# Patient Record
Sex: FEMALE | Race: WHITE
[De-identification: names, ages, dates, MRNs, and addresses within clinical notes are randomized per-mention and may not be internally consistent; named-entity substitution may affect disease eponyms.]

---

## 2018-03-01 ENCOUNTER — HOSPITAL ENCOUNTER (EMERGENCY)
Dept: HOSPITAL 47 - EC | Age: 23
Discharge: HOME | End: 2018-03-01
Payer: COMMERCIAL

## 2018-03-01 VITALS
RESPIRATION RATE: 19 BRPM | SYSTOLIC BLOOD PRESSURE: 140 MMHG | HEART RATE: 99 BPM | DIASTOLIC BLOOD PRESSURE: 83 MMHG | TEMPERATURE: 98 F

## 2018-03-01 DIAGNOSIS — T81.4XXA: Primary | ICD-10-CM

## 2018-03-01 DIAGNOSIS — Z88.0: ICD-10-CM

## 2018-03-01 DIAGNOSIS — Z91.040: ICD-10-CM

## 2018-03-01 DIAGNOSIS — Z98.890: ICD-10-CM

## 2018-03-01 PROCEDURE — 87186 SC STD MICRODIL/AGAR DIL: CPT

## 2018-03-01 PROCEDURE — 87205 SMEAR GRAM STAIN: CPT

## 2018-03-01 PROCEDURE — 87070 CULTURE OTHR SPECIMN AEROBIC: CPT

## 2018-03-01 PROCEDURE — 99284 EMERGENCY DEPT VISIT MOD MDM: CPT

## 2018-03-01 PROCEDURE — 87077 CULTURE AEROBIC IDENTIFY: CPT

## 2018-03-01 NOTE — ED
General Adult HPI





- General


Chief complaint: Abdominal Pain


Stated complaint: Poss.infection


Time Seen by Provider: 18 19:45


Source: patient


Mode of arrival: ambulatory


Limitations: no limitations





- History of Present Illness


Initial comments: 


22-year-old female patient presents to the emergency department today for 

evaluation of her  incision site.  Patient states approximate one week 

ago the left and of the incision seemed to open and has been draining pus.  She 

states that the area is tender to the touch.  She underwent  on 2018 with a doctor at [facility] in [place]. States things the been 

going well.  She denies any abdominal pain with this.  Denies any fevers or 

chills.  Denies any nausea or vomiting.  She denies any abnormal vaginal 

bleeding or discharge.  Patient denies any recent rash, shortness breath, chest 

pain, diarrhea, constipation, back pain, numbness, tingling, dizziness, weakness

, hematuria, dysuria, urinary urgency, urinary frequency, headache, visual 

changes, or any other complaints.








- Related Data


 Previous Rx's











 Medication  Instructions  Recorded


 


Cephalexin [Keflex] 500 mg PO Q6H #40 cap 18











 Allergies











Allergy/AdvReac Type Severity Reaction Status Date / Time


 


latex Allergy  Rash/Hives Verified 18 20:15


 


Penicillins Allergy  Swelling Verified 18 20:15














Review of Systems


ROS Statement: 


Those systems with pertinent positive or pertinent negative responses have been 

documented in the HPI.





ROS Other: All systems not noted in ROS Statement are negative.





Past Medical History


Past Medical History: No Reported History


History of Any Multi-Drug Resistant Organisms: None Reported


Past Surgical History:  Section


Past Psychological History: Depression


Smoking Status: Never smoker


Past Alcohol Use History: None Reported


Past Drug Use History: None Reported





General Exam


Limitations: no limitations


General appearance: alert, in no apparent distress, other (This is a well-

developed, well-nourished, nontoxic appearing adult female patient in no acute 

distress.  Vital signs upon presentation are temperature 98.0F, pulse 99, 

respirations 19, blood pressure 140/83, pulse ox 100% on room air.)


Eye exam: Present: normal appearance, PERRL, EOMI.  Absent: scleral icterus, 

conjunctival injection, periorbital swelling


ENT exam: Present: normal exam, normal oropharynx, mucous membranes moist


Respiratory exam: Present: normal lung sounds bilaterally.  Absent: respiratory 

distress, wheezes, rales, rhonchi, stridor


Cardiovascular Exam: Present: regular rate, normal rhythm, normal heart sounds.

  Absent: systolic murmur, diastolic murmur, rubs, gallop, clicks


GI/Abdominal exam: Present: soft, normal bowel sounds, other (There is a 

horizontal incision at the suprapubic area.  The left and the incision does 

show an open wound with very small amount of drainage.  Abdomen is soft and 

nontender around the area.  There is mild surrounding erythema.).  Absent: 

distended, tenderness, guarding, rebound, rigid


Neurological exam: Present: alert, oriented X3, CN II-XII intact


Psychiatric exam: Present: normal affect, normal mood


Skin exam: Present: warm, dry, intact, normal color.  Absent: rash





Course


 Vital Signs











  18





  19:38


 


Temperature 98.0 F


 


Pulse Rate 99


 


Respiratory 19





Rate 


 


Blood Pressure 140/83


 


O2 Sat by Pulse 100





Oximetry 














Medical Decision Making





- Medical Decision Making


22-year-old female patient presented for evaluation of her  incision 

site.  Physical examination does reveal an open wound area to the left end of 

the incision.  The site shows very minimal drainage.  There is minimal 

surrounding erythema.  We will start her on Keflex.  We will discharge her home 

as she is afebrile and nontoxic.  She is instructed to follow-up with her OB/

GYN as soon as possible.  She is instructed to return here immediately for any 

new, worsening, or concerning symptoms.  Return parameters discussed in detail.

  She verbalizes understanding and agrees with this plan.








Disposition


Clinical Impression: 


 Surgical wound infection





Disposition: HOME SELF-CARE


Condition: Good


Instructions:  Acute Wound Care (ED)


Additional Instructions: 


Take antibiotic prescription in full.  Continue taking ibuprofen for pain 

control.  Keep wound clean and dry. Follow up with your OBGYN as soon as 

possible. Return here immediately for any new, worsening, or concerning 

symptoms.


Prescriptions: 


Cephalexin [Keflex] 500 mg PO Q6H #40 cap


Referrals: 


None,Stated [Primary Care Provider] - 1-2 days


Time of Disposition: 20:13

## 2021-04-21 ENCOUNTER — HOSPITAL ENCOUNTER (EMERGENCY)
Dept: HOSPITAL 47 - EC | Age: 26
Discharge: HOME | End: 2021-04-21
Payer: COMMERCIAL

## 2021-04-21 VITALS — RESPIRATION RATE: 18 BRPM

## 2021-04-21 VITALS — TEMPERATURE: 98 F | SYSTOLIC BLOOD PRESSURE: 120 MMHG | DIASTOLIC BLOOD PRESSURE: 81 MMHG | HEART RATE: 92 BPM

## 2021-04-21 DIAGNOSIS — F32.9: ICD-10-CM

## 2021-04-21 DIAGNOSIS — N39.0: Primary | ICD-10-CM

## 2021-04-21 DIAGNOSIS — Z88.0: ICD-10-CM

## 2021-04-21 LAB
ALBUMIN SERPL-MCNC: 4.7 G/DL (ref 3.5–5)
ALP SERPL-CCNC: 71 U/L (ref 38–126)
ALT SERPL-CCNC: 17 U/L (ref 4–34)
AMYLASE SERPL-CCNC: 54 U/L (ref 30–110)
ANION GAP SERPL CALC-SCNC: 8 MMOL/L
AST SERPL-CCNC: 25 U/L (ref 14–36)
BASOPHILS # BLD AUTO: 0 K/UL (ref 0–0.2)
BASOPHILS NFR BLD AUTO: 0 %
BUN SERPL-SCNC: 13 MG/DL (ref 7–17)
CALCIUM SPEC-MCNC: 9.7 MG/DL (ref 8.4–10.2)
CHLORIDE SERPL-SCNC: 104 MMOL/L (ref 98–107)
CO2 SERPL-SCNC: 28 MMOL/L (ref 22–30)
EOSINOPHIL # BLD AUTO: 0 K/UL (ref 0–0.7)
EOSINOPHIL NFR BLD AUTO: 1 %
ERYTHROCYTE [DISTWIDTH] IN BLOOD BY AUTOMATED COUNT: 4.99 M/UL (ref 3.8–5.4)
ERYTHROCYTE [DISTWIDTH] IN BLOOD: 13 % (ref 11.5–15.5)
GLUCOSE SERPL-MCNC: 97 MG/DL (ref 74–99)
HCG SERPL-MCNC: <2.4 MIU/ML
HCT VFR BLD AUTO: 42.3 % (ref 34–46)
HGB BLD-MCNC: 14.5 GM/DL (ref 11.4–16)
LIPASE SERPL-CCNC: 145 U/L (ref 23–300)
LYMPHOCYTES # SPEC AUTO: 2.1 K/UL (ref 1–4.8)
LYMPHOCYTES NFR SPEC AUTO: 31 %
MCH RBC QN AUTO: 29.1 PG (ref 25–35)
MCHC RBC AUTO-ENTMCNC: 34.3 G/DL (ref 31–37)
MCV RBC AUTO: 84.7 FL (ref 80–100)
MONOCYTES # BLD AUTO: 0.5 K/UL (ref 0–1)
MONOCYTES NFR BLD AUTO: 7 %
NEUTROPHILS # BLD AUTO: 4.1 K/UL (ref 1.3–7.7)
NEUTROPHILS NFR BLD AUTO: 60 %
PH UR: 6.5 [PH] (ref 5–8)
PLATELET # BLD AUTO: 228 K/UL (ref 150–450)
POTASSIUM SERPL-SCNC: 3.5 MMOL/L (ref 3.5–5.1)
PROT SERPL-MCNC: 8 G/DL (ref 6.3–8.2)
RBC UR QL: 2 /HPF (ref 0–5)
SODIUM SERPL-SCNC: 140 MMOL/L (ref 137–145)
SP GR UR: 1.03 (ref 1–1.03)
SQUAMOUS UR QL AUTO: 7 /HPF (ref 0–4)
UROBILINOGEN UR QL STRIP: <2 MG/DL (ref ?–2)
WBC # BLD AUTO: 6.9 K/UL (ref 3.8–10.6)
WBC #/AREA URNS HPF: 20 /HPF (ref 0–5)

## 2021-04-21 PROCEDURE — 99284 EMERGENCY DEPT VISIT MOD MDM: CPT

## 2021-04-21 PROCEDURE — 36415 COLL VENOUS BLD VENIPUNCTURE: CPT

## 2021-04-21 PROCEDURE — 87491 CHLMYD TRACH DNA AMP PROBE: CPT

## 2021-04-21 PROCEDURE — 96374 THER/PROPH/DIAG INJ IV PUSH: CPT

## 2021-04-21 PROCEDURE — 87591 N.GONORRHOEAE DNA AMP PROB: CPT

## 2021-04-21 PROCEDURE — 93975 VASCULAR STUDY: CPT

## 2021-04-21 PROCEDURE — 80053 COMPREHEN METABOLIC PANEL: CPT

## 2021-04-21 PROCEDURE — 84702 CHORIONIC GONADOTROPIN TEST: CPT

## 2021-04-21 PROCEDURE — 76830 TRANSVAGINAL US NON-OB: CPT

## 2021-04-21 PROCEDURE — 81001 URINALYSIS AUTO W/SCOPE: CPT

## 2021-04-21 PROCEDURE — 85025 COMPLETE CBC W/AUTO DIFF WBC: CPT

## 2021-04-21 PROCEDURE — 81025 URINE PREGNANCY TEST: CPT

## 2021-04-21 PROCEDURE — 87086 URINE CULTURE/COLONY COUNT: CPT

## 2021-04-21 PROCEDURE — 82150 ASSAY OF AMYLASE: CPT

## 2021-04-21 PROCEDURE — 83690 ASSAY OF LIPASE: CPT

## 2021-04-21 NOTE — US
EXAMINATION TYPE: US transvaginal

 

DATE OF EXAM: 4/21/2021

 

COMPARISON: NONE

 

CLINICAL HISTORY: abdominal pain. Pelvic pain

 

TECHNIQUE:  Transvaginal (TV). 

 

Date of LMP:  03/28/2021

 

EXAM MEASUREMENTS:

 

Uterus:  7.6 x 4.0 x 4.8  cm

Endometrial Stripe: .7 cm

Right Ovary:  2.4 x 1.3 x 1.8 cm

Left Ovary:  2.4 x 1.6 x 1.9 cm

 

 

 

1. Uterus:  Anteverted   wnl

2. Endometrium:  wnl

3. Right Ovary:  wnl

4. Left Ovary:  wnl

**Spectral, color and waveform doppler imaging shows good arterial and venous flow within the ovaries
.

5. Bilateral Adnexa:  wnl

6. Posterior cul-de-sac:  wnl

 

Endometrial stripe within normal limits for secretory phase of menstrual cycle. Heterogeneous normal 
size uterus. Both ovaries seen with scattered small peripheral follicles. No adnexal masses noted.

 

IMPRESSION: Unremarkable study.

## 2021-04-21 NOTE — ED
General Adult HPI





- General


Chief complaint: Abdominal Pain


Stated complaint: abdominal pain


Time Seen by Provider: 21 14:44


Source: patient


Mode of arrival: ambulatory


Limitations: no limitations





- History of Present Illness


Initial comments: 





25-year-old female presents to the emergency room for a chief complaint of lower

abdominal pain.  Patient reports this has been ongoing for 11 days now.  States 

it feels like menstrual cramping.  Patient states she is due to start her cycle 

in 6 days.  Patient states it comes and goes but today to sensitivity going 

away.  Patient denies fevers.  Denies back pain.  Denies nausea vomiting 

diarrhea.  Patient denies any vaginal discharge or concern for STDs.  Patient 

denies any chance of pregnancy.  States she had 2 negatives already.  Patient 

has no other complaints at this time including shortness of breath, chest pain, 

nausea or vomiting, headache, or visual changes.





- Related Data


                                  Previous Rx's











 Medication  Instructions  Recorded


 


Sulfamethox-Tmp 800-160Mg [Bactrim 1 tab PO Q12HR #10 tab 21





-160 mg]  











                                    Allergies











Allergy/AdvReac Type Severity Reaction Status Date / Time


 


latex Allergy  Rash/Hives Verified 21 15:56


 


Penicillins Allergy  Swelling Verified 21 15:56














Review of Systems


ROS Statement: 


Those systems with pertinent positive or pertinent negative responses have been 

documented in the HPI.





ROS Other: All systems not noted in ROS Statement are negative.





Past Medical History


Past Medical History: No Reported History


History of Any Multi-Drug Resistant Organisms: None Reported


Date of last positivie culture/infection: 3/1/18


MDRO Source:: ABDOMEN


Past Surgical History:  Section


Past Psychological History: Depression


Smoking Status: Never smoker


Past Alcohol Use History: None Reported


Past Drug Use History: None Reported





General Exam


Limitations: no limitations


General appearance: alert, in no apparent distress


Head exam: Present: atraumatic, normocephalic, normal inspection


Eye exam: Present: normal appearance, PERRL, EOMI.  Absent: scleral icterus, 

conjunctival injection, periorbital swelling


ENT exam: Present: normal exam, mucous membranes moist


Neck exam: Present: normal inspection, full ROM.  Absent: tenderness, 

meningismus, lymphadenopathy


Respiratory exam: Present: normal lung sounds bilaterally.  Absent: respiratory 

distress, wheezes, rales, rhonchi, stridor


Cardiovascular Exam: Present: regular rate, normal rhythm, normal heart sounds. 

Absent: systolic murmur, diastolic murmur, rubs, gallop, clicks


GI/Abdominal exam: Present: soft, tenderness (Generalized minimal lower 

abdominal tenderness without guarding or rebound.  No upper abdominal 

tenderness.), normal bowel sounds.  Absent: distended, guarding, rebound, rigid


Neurological exam: Present: alert





Course


                                   Vital Signs











  21





  14:22


 


Temperature 97.9 F


 


Pulse Rate 80


 


Respiratory 18





Rate 


 


Blood Pressure 145/95


 


O2 Sat by Pulse 99





Oximetry 














Medical Decision Making





- Medical Decision Making





Vitals are stable.  Patient is afebrile.  Physical exam pertinent for minimal 

lower abdominal tenderness without guarding or rebound.  CBC is unremarkable.  

White count is normal at 6.  CMP is unremarkable.  HCG is negative.  Urinalysis 

does show some possible evidence of urinary tract infection and patient will be 

treated accordingly.  Ultrasound was obtained which was unremarkable.  No 

evidence of torsion or cysts.  At this time patient is afebrile with a normal 

white count with symptoms ongoing for 11 days, I do not currently suspect 

appendicitis.  Patient is stable for outpatient follow-up.  She'll follow up 

with primary care. She has an appointment tomorrow morning. She'll return here 

for any worsening symptoms.





I discussed this case with attending Dr. Hernandez who agrees with this assessment

and treatment plan.





- Lab Data


Result diagrams: 


                                 21 15:11





                                 21 15:11


                                   Lab Results











  21 Range/Units





  14:48 14:48 15:11 


 


WBC    6.9  (3.8-10.6)  k/uL


 


RBC    4.99  (3.80-5.40)  m/uL


 


Hgb    14.5  (11.4-16.0)  gm/dL


 


Hct    42.3  (34.0-46.0)  %


 


MCV    84.7  (80.0-100.0)  fL


 


MCH    29.1  (25.0-35.0)  pg


 


MCHC    34.3  (31.0-37.0)  g/dL


 


RDW    13.0  (11.5-15.5)  %


 


Plt Count    228  (150-450)  k/uL


 


MPV    6.8  


 


Neutrophils %    60  %


 


Lymphocytes %    31  %


 


Monocytes %    7  %


 


Eosinophils %    1  %


 


Basophils %    0  %


 


Neutrophils #    4.1  (1.3-7.7)  k/uL


 


Lymphocytes #    2.1  (1.0-4.8)  k/uL


 


Monocytes #    0.5  (0-1.0)  k/uL


 


Eosinophils #    0.0  (0-0.7)  k/uL


 


Basophils #    0.0  (0-0.2)  k/uL


 


Sodium     (137-145)  mmol/L


 


Potassium     (3.5-5.1)  mmol/L


 


Chloride     ()  mmol/L


 


Carbon Dioxide     (22-30)  mmol/L


 


Anion Gap     mmol/L


 


BUN     (7-17)  mg/dL


 


Creatinine     (0.52-1.04)  mg/dL


 


Est GFR (CKD-EPI)AfAm     (>60 ml/min/1.73 sqM)  


 


Est GFR (CKD-EPI)NonAf     (>60 ml/min/1.73 sqM)  


 


Glucose     (74-99)  mg/dL


 


Calcium     (8.4-10.2)  mg/dL


 


Total Bilirubin     (0.2-1.3)  mg/dL


 


AST     (14-36)  U/L


 


ALT     (4-34)  U/L


 


Alkaline Phosphatase     ()  U/L


 


Total Protein     (6.3-8.2)  g/dL


 


Albumin     (3.5-5.0)  g/dL


 


Amylase     ()  U/L


 


Lipase     ()  U/L


 


HCG, Quant     mIU/mL


 


Urine Color  Yellow    


 


Urine Appearance  Clear    (Clear)  


 


Urine pH  6.5    (5.0-8.0)  


 


Ur Specific Gravity  1.027    (1.001-1.035)  


 


Urine Protein  Negative    (Negative)  


 


Urine Glucose (UA)  Negative    (Negative)  


 


Urine Ketones  Negative    (Negative)  


 


Urine Blood  Negative    (Negative)  


 


Urine Nitrite  Negative    (Negative)  


 


Urine Bilirubin  Negative    (Negative)  


 


Urine Urobilinogen  <2.0    (<2.0)  mg/dL


 


Ur Leukocyte Esterase  Large H    (Negative)  


 


Urine RBC  2    (0-5)  /hpf


 


Urine WBC  20 H    (0-5)  /hpf


 


Ur Squamous Epith Cells  7 H    (0-4)  /hpf


 


Urine Bacteria  Rare H    (None)  /hpf


 


Urine Mucus  Rare H    (None)  /hpf


 


Urine HCG, Qual   Not Detected   (Not Detectd)  














  21 Range/Units





  15:11 


 


WBC   (3.8-10.6)  k/uL


 


RBC   (3.80-5.40)  m/uL


 


Hgb   (11.4-16.0)  gm/dL


 


Hct   (34.0-46.0)  %


 


MCV   (80.0-100.0)  fL


 


MCH   (25.0-35.0)  pg


 


MCHC   (31.0-37.0)  g/dL


 


RDW   (11.5-15.5)  %


 


Plt Count   (150-450)  k/uL


 


MPV   


 


Neutrophils %   %


 


Lymphocytes %   %


 


Monocytes %   %


 


Eosinophils %   %


 


Basophils %   %


 


Neutrophils #   (1.3-7.7)  k/uL


 


Lymphocytes #   (1.0-4.8)  k/uL


 


Monocytes #   (0-1.0)  k/uL


 


Eosinophils #   (0-0.7)  k/uL


 


Basophils #   (0-0.2)  k/uL


 


Sodium  140  (137-145)  mmol/L


 


Potassium  3.5  (3.5-5.1)  mmol/L


 


Chloride  104  ()  mmol/L


 


Carbon Dioxide  28  (22-30)  mmol/L


 


Anion Gap  8  mmol/L


 


BUN  13  (7-17)  mg/dL


 


Creatinine  0.55  (0.52-1.04)  mg/dL


 


Est GFR (CKD-EPI)AfAm  >90  (>60 ml/min/1.73 sqM)  


 


Est GFR (CKD-EPI)NonAf  >90  (>60 ml/min/1.73 sqM)  


 


Glucose  97  (74-99)  mg/dL


 


Calcium  9.7  (8.4-10.2)  mg/dL


 


Total Bilirubin  0.5  (0.2-1.3)  mg/dL


 


AST  25  (14-36)  U/L


 


ALT  17  (4-34)  U/L


 


Alkaline Phosphatase  71  ()  U/L


 


Total Protein  8.0  (6.3-8.2)  g/dL


 


Albumin  4.7  (3.5-5.0)  g/dL


 


Amylase  54  ()  U/L


 


Lipase  145  ()  U/L


 


HCG, Quant  <2.4  mIU/mL


 


Urine Color   


 


Urine Appearance   (Clear)  


 


Urine pH   (5.0-8.0)  


 


Ur Specific Gravity   (1.001-1.035)  


 


Urine Protein   (Negative)  


 


Urine Glucose (UA)   (Negative)  


 


Urine Ketones   (Negative)  


 


Urine Blood   (Negative)  


 


Urine Nitrite   (Negative)  


 


Urine Bilirubin   (Negative)  


 


Urine Urobilinogen   (<2.0)  mg/dL


 


Ur Leukocyte Esterase   (Negative)  


 


Urine RBC   (0-5)  /hpf


 


Urine WBC   (0-5)  /hpf


 


Ur Squamous Epith Cells   (0-4)  /hpf


 


Urine Bacteria   (None)  /hpf


 


Urine Mucus   (None)  /hpf


 


Urine HCG, Qual   (Not Detectd)  














Disposition


Clinical Impression: 


 Abdominal pain, UTI (urinary tract infection)





Disposition: HOME SELF-CARE


Condition: Good


Instructions (If sedation given, give patient instructions):  Abdominal Pain 

(ED)


Additional Instructions: 


Please take Motrin and Tylenol for pain.  Please follow-up with your doctor in 

1-2 days.  Return to the emergency room for any worsening symptoms.


Prescriptions: 


Sulfamethox-Tmp 800-160Mg [Bactrim -160 mg] 1 tab PO Q12HR #10 tab


Is patient prescribed a controlled substance at d/c from ED?: No


Referrals: 


Clementina Peterson DO [Primary Care Provider] - 1-2 days


Time of Disposition: 16:38

## 2022-03-07 ENCOUNTER — HOSPITAL ENCOUNTER (OUTPATIENT)
Dept: HOSPITAL 47 - FBPOP | Age: 27
Discharge: HOME | End: 2022-03-07
Attending: OBSTETRICS & GYNECOLOGY
Payer: COMMERCIAL

## 2022-03-07 VITALS
SYSTOLIC BLOOD PRESSURE: 132 MMHG | HEART RATE: 90 BPM | RESPIRATION RATE: 18 BRPM | DIASTOLIC BLOOD PRESSURE: 76 MMHG | TEMPERATURE: 97.3 F

## 2022-03-07 DIAGNOSIS — R10.30: ICD-10-CM

## 2022-03-07 DIAGNOSIS — O26.892: Primary | ICD-10-CM

## 2022-03-07 DIAGNOSIS — Z91.040: ICD-10-CM

## 2022-03-07 DIAGNOSIS — Z88.0: ICD-10-CM

## 2022-03-07 DIAGNOSIS — Z3A.26: ICD-10-CM

## 2022-03-07 DIAGNOSIS — R10.2: ICD-10-CM

## 2022-03-07 LAB
KETONES UR QL STRIP.AUTO: (no result)
PH UR: 6 [PH] (ref 5–8)
PROT UR QL: (no result)
RBC UR QL: 3 /HPF (ref 0–5)
SP GR UR: 1.03 (ref 1–1.03)
SQUAMOUS UR QL AUTO: 21 /HPF (ref 0–4)
UROBILINOGEN UR QL STRIP: 2 MG/DL (ref ?–2)
WBC #/AREA URNS HPF: 53 /HPF (ref 0–5)

## 2022-03-07 PROCEDURE — 99213 OFFICE O/P EST LOW 20 MIN: CPT

## 2022-03-07 PROCEDURE — 81001 URINALYSIS AUTO W/SCOPE: CPT

## 2022-03-07 NOTE — P.MSEPDOC
Presenting Problems





- Arrival Data


Date of Arrival on Unit: 22


Time of Arrival on Unit: 16:00


Mode of Transport: Ambulatory





- Complaint


OB-Reason for Admission/Chief Complaint: Pain


Comment: Leg pain - groin, low abdominal pain, bruised feeling in the bladder, 

vaginal pain.





Prenatal Medical History





- Pregnancy Information


: 3


Para: 2


Term: 1


: 1


Abortions: Spontaneous or Elective: 0


Number of Living Children: 2





- Gestational Age


Gestational Age by CHANDRAKANT (wks/days): 26 Weeks and 0 Days





- Prenatal History


Pregnancy Complications: Prior , Prior 





Review of Systems





- Review of Systems


Constitutional: No problems


Breast: No problems


ENT: No problems


Cardiovascular: No problems


Respiratory: No problems


Gastrointestinal: No problems


Genitourinary: No problems


Musculoskeletal: No problems


Neurological: No problems


Skin: No problems





Vital Signs





- Temperature


Temperature: 97.3 F


Temperature Source: Temporal Artery Scan





- Pulse


  ** Right Sitting Brachial


Pulse Rate: 90


Pulse Assessment Method: Automatic Cuff





- Respirations


Respiratory Rate: 18


Oxygen Delivery Method: Room Air


O2 Sat by Pulse Oximetry: 100





- Blood Pressure


  ** Right Arm Sitting


Blood Pressure: 132/76


Blood Pressure Mean: 94


Blood Pressure Source: Automatic Cuff





Medical Screen Scoring





- Cervical Exam


Dilation (cm): 0


Effacement (%): 0


Station: -3





- Fetal Assessment - Baby A


Baseline FHR: 140


Fetal Heart Rate - NICHD Category: Category I (Normal)


NST: Reactive





Physician Notification





- Physician Notified


Physician Notified Date: 22


Physician Notified Time: 17:40


Physician: Mackenzie Mathur


New Order Received: Yes





- Notification Comment


Comment: Cervix closed, UA results reviewed. Ok to dc home, increase po fluids, 

rest, tylenol as directed and follow up with Dr Ayala as scheduled.





Maternal Fetal Triage Index





- Maternal Fetal Triage Index


Presenting for scheduled procedure w/no complaint: No





- Stat/Priority 1


Stat Priority 1: No





- Urgent/Priority 2


Urgent Priority 2: No





- Prompt/Priority 3


Prompt Priority 3: No





- Non-Urgent/Priority 4


Non-Urgent Priority 4: No





- Scheduled/Requesting Priority 5


Scheduled/Requesting Priority 5: No





Disposition





- Disposition


OB Disposition: Discharge to home


Discharge Date: 22


Discharge Time: 17:45


I agree with the RN Medical Screening Exam: Yes


Case reviewed; plan agreed upon as documented in EMR&OBIX.: Yes


Diagnosis: PREGNANCY RELATED CONDITIONS, UNSPECIFIED, SECOND TRIMESTER

## 2022-04-24 ENCOUNTER — HOSPITAL ENCOUNTER (OUTPATIENT)
Dept: HOSPITAL 47 - FBPOP | Age: 27
Discharge: HOME | End: 2022-04-24
Attending: OBSTETRICS & GYNECOLOGY
Payer: COMMERCIAL

## 2022-04-24 VITALS
TEMPERATURE: 98.5 F | SYSTOLIC BLOOD PRESSURE: 129 MMHG | DIASTOLIC BLOOD PRESSURE: 71 MMHG | RESPIRATION RATE: 16 BRPM | HEART RATE: 90 BPM

## 2022-04-24 DIAGNOSIS — Z3A.32: ICD-10-CM

## 2022-04-24 DIAGNOSIS — Z88.0: ICD-10-CM

## 2022-04-24 DIAGNOSIS — N89.8: ICD-10-CM

## 2022-04-24 DIAGNOSIS — O9A.213: Primary | ICD-10-CM

## 2022-04-24 DIAGNOSIS — S80.211A: ICD-10-CM

## 2022-04-24 DIAGNOSIS — Z91.040: ICD-10-CM

## 2022-04-24 PROCEDURE — 59025 FETAL NON-STRESS TEST: CPT

## 2022-04-24 PROCEDURE — 99213 OFFICE O/P EST LOW 20 MIN: CPT

## 2022-04-27 NOTE — P.MSEPDOC
Presenting Problems





- Arrival Data


Date of Arrival on Unit: 22


Time of Arrival on Unit: 15:13


Mode of Transport: Wheelchair





- Complaint


OB-Reason for Admission/Chief Complaint: Other


Comment: pt arrived c/o tripping on dogs water dish and fall pt has a scuff on 

her right knee and left hand. pt also c/o some mucus pinkish discharge when she 

wiped and c/o some mild cramping





Prenatal Medical History





- Pregnancy Information


: 3


Para: 2


Term: 1


: 1


Number of Living Children: 2





- Gestational Age


Gestational Age by CHANDRAKANT (wks/days): 32 Weeks and 5 Days





Review of Systems





- Review of Systems


Constitutional: No problems


Breast: No problems


ENT: No problems


Cardiovascular: No problems


Respiratory: No problems


Gastrointestinal: No problems


Genitourinary: No problems


Musculoskeletal: No problems


Neurological: No problems


Skin: No problems





Vital Signs





- Temperature


Temperature: 98.5 F


Temperature Source: Oral





- Pulse


  ** Right Brachial


Pulse Rate: 90


Pulse Assessment Method: Automatic Cuff





- Respirations


Respiratory Rate: 16


Oxygen Delivery Method: Room Air


O2 Sat by Pulse Oximetry: 98





- Blood Pressure


  ** Right Arm


Blood Pressure: 129/71


Blood Pressure Mean: 90


Blood Pressure Source: Automatic Cuff





Medical Screen Scoring





- Cervical Exam


Dilation (cm): 0.5


Effacement (%): 50


Membranes: Intact





- Uterine Contractions


Frequency From (mins): 0


Frequency To (mins): 0


Duration From (seconds): 0


Resting: Soft to palpation





- Fetal Assessment - Baby A


Baseline FHR: 140


Fetal Heart Rate - NICHD Category: Category I (Normal)





Physician Notification





- Physician Notified


Physician Notified Date: 22


Physician Notified Time: 16:23


Physician: Dr Ayala


New Order Received: Yes





- Notification Comment


Comment: may discharged to home with instructions





Maternal Fetal Triage Index





- Urgent/Priority 2


Urgent Priority 2: Yes


Provider Notified: Dr Ayala


Provider Notified Time: 16:23


Criteria Met for Priority 2: pt 32 5/7 weeks pregnant abd soft no active 

bleeding. reactive NST CERVIX FT THICK WITH NO blood noted on exam glove. pt 

discharged with instructions and to keep f/u appointment with Dr Ayala





Disposition





- Disposition


OB Disposition: Discharge to home


Discharge Date: 22


Discharge Time: 17:09


I agree with the RN Medical Screening Exam: Yes


Case reviewed; plan agreed upon as documented in EMR&OBIX.: Yes


Diagnosis: FALL SAME LEV FROM SLIP/TRIP W/O STRIKE AGAINST OBJECT, INIT

## 2022-05-22 ENCOUNTER — HOSPITAL ENCOUNTER (INPATIENT)
Dept: HOSPITAL 47 - FBPOP | Age: 27
LOS: 2 days | Discharge: HOME | End: 2022-05-24
Attending: OBSTETRICS & GYNECOLOGY | Admitting: OBSTETRICS & GYNECOLOGY
Payer: COMMERCIAL

## 2022-05-22 DIAGNOSIS — Z3A.37: ICD-10-CM

## 2022-05-22 DIAGNOSIS — K66.0: ICD-10-CM

## 2022-05-22 DIAGNOSIS — O34.211: Primary | ICD-10-CM

## 2022-05-22 DIAGNOSIS — Z82.49: ICD-10-CM

## 2022-05-22 LAB
BASOPHILS # BLD AUTO: 0 K/UL (ref 0–0.2)
BASOPHILS NFR BLD AUTO: 0 %
EOSINOPHIL # BLD AUTO: 0 K/UL (ref 0–0.7)
EOSINOPHIL NFR BLD AUTO: 0 %
ERYTHROCYTE [DISTWIDTH] IN BLOOD BY AUTOMATED COUNT: 4.13 M/UL (ref 3.8–5.4)
ERYTHROCYTE [DISTWIDTH] IN BLOOD: 14.5 % (ref 11.5–15.5)
HCT VFR BLD AUTO: 35.6 % (ref 34–46)
HGB BLD-MCNC: 11.9 GM/DL (ref 11.4–16)
LYMPHOCYTES # SPEC AUTO: 1.3 K/UL (ref 1–4.8)
LYMPHOCYTES NFR SPEC AUTO: 17 %
MCH RBC QN AUTO: 28.7 PG (ref 25–35)
MCHC RBC AUTO-ENTMCNC: 33.4 G/DL (ref 31–37)
MCV RBC AUTO: 86.1 FL (ref 80–100)
MONOCYTES # BLD AUTO: 0.5 K/UL (ref 0–1)
MONOCYTES NFR BLD AUTO: 7 %
NEUTROPHILS # BLD AUTO: 5.6 K/UL (ref 1.3–7.7)
NEUTROPHILS NFR BLD AUTO: 72 %
PLATELET # BLD AUTO: 242 K/UL (ref 150–450)
WBC # BLD AUTO: 7.7 K/UL (ref 3.8–10.6)

## 2022-05-22 PROCEDURE — 86900 BLOOD TYPING SEROLOGIC ABO: CPT

## 2022-05-22 PROCEDURE — 59025 FETAL NON-STRESS TEST: CPT

## 2022-05-22 PROCEDURE — 85025 COMPLETE CBC W/AUTO DIFF WBC: CPT

## 2022-05-22 PROCEDURE — 86901 BLOOD TYPING SEROLOGIC RH(D): CPT

## 2022-05-22 PROCEDURE — 99213 OFFICE O/P EST LOW 20 MIN: CPT

## 2022-05-22 PROCEDURE — 86850 RBC ANTIBODY SCREEN: CPT

## 2022-05-22 RX ADMIN — POTASSIUM CHLORIDE SCH MLS/HR: 14.9 INJECTION, SOLUTION INTRAVENOUS at 19:25

## 2022-05-22 RX ADMIN — ACETAMINOPHEN SCH: 500 TABLET ORAL at 22:35

## 2022-05-22 RX ADMIN — DOCUSATE SODIUM AND SENNOSIDES SCH EACH: 50; 8.6 TABLET ORAL at 21:00

## 2022-05-22 NOTE — P.OP
Date of Procedure: 22


Preoperative Diagnosis: 


1.  Intrauterine pregnancy at 36-6/7 weeks.


2.  Previous classical .


3.  Active labor.





Postoperative Diagnosis: 


Same


Procedure(s) Performed: 


Repeat low transverse  section


Anesthesia: spinal (Duramorph)


Surgeon: Clara Ayala


Assistant #1: Teresa Gibbs


Estimated Blood Loss (ml): 500


Pathology: none sent


Condition: stable


Disposition: floor


Indications for Procedure: 


This is a 26-year-old female  3 para 2 at 36-6/7 weeks who presented in 

active labor with complaints of contractions every 5 minutes for several hours. 

Upon arrival to triage she made cervical change from 1-1/2-3 cm within an hour's

time and was breathing through her contractions.  She denied any rupture of 

membranes.  She does have a history of a previous classical  section due

to twin gestation at 28 weeks with her previous pregnancy.





I have discussed the risks, benefits, and alternative therapies for the above-

mentioned procedure and for both sedation/anesthesia as well as necessary blood 

products administration, if indicated, as they pertain to this patient.  The 

patient has indicated her understanding and acceptance of the risks and 

procedures discussed.





Operative Findings: 


A viable female infant is noted in the vertex presentation with Apgar scores of 

8 at 1 minute and 9 at 5 minutes and infant weight of 5 lbs. 9 oz.  Normal 

uterus tubes and ovaries are noted.  There is noted to be some omental adhesions

to the anterior abdominal wall and the lower uterine segment.


Description of Procedure: 


The patient is taken to the operating room where she is placed in the dorsal 

supine position with leftward tilt after spinal Duramorph anesthesia is given.  

She is prepped and draped in the normal sterile fashion.  Skin was tested and 

found to be adequately anesthetized.  A Pfannenstiel skin incision was made with

a scalpel.  A second knife was used to carry the incision down to the underlying

layer of fascia through the previous laparotomy scar.  The fascia was nicked in 

the midline with a scalpel and then extended laterally bilaterally with Ashford 

scissors.  The anterior lip of the fascia was grasped with 2 Kocher clamps and 

then dissected off the underlying rectus muscle in the midline with Ashford 

scissors.  The inferior aspect of the fascial incision was grasped with 2 Kocher

clamps and dissected off the underlying rectus muscle and the midline with Ashford 

scissors.  Omentum is noted to be adherent to the anterior abdominal wall.  

Peritoneum regarding then entered in the dissection of the rectus muscles.  The 

incision is extended superiorly and inferiorly with Metzenbaum scissors.  Next a

DeLee retractor is placed.  The vesicouterine peritoneum is entered sharply with

Metzenbaum scissors and extended laterally bilaterally with Metzenbaum scissors 

and then the bladder flap is pushed inferiorly.  There were several omental 

adhesions to the lower uterine segment that were removed with Bovie cautery.  

The lower uterine segment is incised in transverse fashion with the scalpel and 

then bluntly entered with a hemostat.  Clear fluid is noted.  The incision was 

then extended laterally bilaterally with 2 fingers.  Next the infant's head is 

delivered through the incision.  Nose and mouth are bulb suctioned.  The 

remainder of the infant is easily delivered and placed on mother's abdomen.  

Cord is clamped and cut.  Infant is taken to warmer by nursing staff.  Uterine 

fundus is gently massaged and placenta is delivered manually.  Uterus is 

exteriorized and cleared of all clots and debris.  Uterine incision is closed 

with 0 Vicryl suture in a running locked fashion.  A second layer of 0 Vicryl 

suture is used in a running fashion for hemostasis.  A couple interrupted 

stitches are also placed above the incision on the uterine wall where previous 

adhesion had been removed.  Due to the previous adhesions, the vesicouterine 

peritoneum was not reapproximated.  Posterior cul-de-sac is suctioned of all 

clots and debris.  Uterus is returned to the abdomen.  Incision is noted to be 

hemostatic.  A piece of Interceed is placed over the uterine incision to prevent

adhesions.  Peritoneal layer is closed with 0 Vicryl suture in a running fash

ion.  Muscle layer is reapproximated with 0 Vicryl suture in interrupted 

fashion.  Fascia layer is then closed with 0 PDS suture with 2 sutures meeting 

in the midline and the knots buried in either side and in the midline.  The 

subcutaneous tissue was then closed with 2-0 Vicryl suture.  Skin layer was then

closed with staples.  All sponge and needle counts are correct.  The patient is 

taken to recovery room in stable condition.

## 2022-05-22 NOTE — P.HPOB
History of Present Illness


H&P Date: 22


Chief Complaint: Contractions





This is a 26-year-old female  3 para 2 with an estimated date of 

confinement of 2022, estimated gestational age of 37-6/7 weeks, who 

presents to labor and delivery with complaints of contractions that have been 

every 5 minutes for the past few hours and are getting stronger.  Prenatal 

course has been essentially uncomplicated.  She has had a lot of pelvic and 

lower abdominal pain throughout her pregnancy.  Her last pregnancy was 

complicated by an emergency classical  section for twins at 28 weeks.





Prenatal labs:


Hepatitis B surface antigen-negative


RPR-nonreactive


Rubella-immune


Blood type-B+


Antibody screen-negative


HIV-nonreactive


Hemoglobin-12.3


Random glucose-86


GC/chlamydia/trickle on his-negative


One hour Glucola-96


Group B streptococcus-positive





Obstetrical history: She 3 P2.  History of 1 vaginal delivery at term without 

complication.  Her second pregnancy was complicated by the emergency classical 

 at 28 weeks for twin gestation with twin-twin transfusion.


Gynecologic history: No history of sexual transmitted diseases


Social history: She is .  She works part-time self-employed working from 

home.





Review of Systems


Constitutional: Denies chills, Denies fever


Eyes: denies blurred vision, denies pain


Ears, nose, mouth and throat: Denies headache, Denies sore throat


Cardiovascular: Denies chest pain, Denies shortness of breath


Respiratory: Denies cough


Gastrointestinal: Reports abdominal pain


Genitourinary: Reports pelvic pain, Reports pregnant


Musculoskeletal: Reports low back pain


Integumentary: Denies pruritus, Denies rash


Neurological: Denies numbness, Denies weakness


Psychiatric: Denies anxiety, Denies depression





Past Medical History


Past Medical History: No Reported History


History of Any Multi-Drug Resistant Organisms: None Reported


Date of last positivie culture/infection: 3/1/18


MDRO Source:: ABDOMEN


Past Surgical History:  Section


Past Psychological History: No Psychological Hx Reported


Smoking Status: Never smoker


Past Alcohol Use History: None Reported


Past Drug Use History: None Reported





- Past Family History


  ** Mother


Family Medical History: Hypertension





Medications and Allergies


                                Home Medications











 Medication  Instructions  Recorded  Confirmed  Type


 


No Known Home Medications  22 History








                                    Allergies











Allergy/AdvReac Type Severity Reaction Status Date / Time


 


latex Allergy  Rash/Hives Verified 22 15:20


 


Penicillins Allergy  Anaphylaxis Verified 22 16:12














Exam


Osteopathic Statement: *.  No significant issues noted on an osteopathic 

structural exam other than those noted in the History and Physical/Consult.


                                Intake and Output











 22





 06:59 14:59 22:59


 


Other:   


 


  Weight   88.451 kg














HEENT: Within normal limits


Heart: Regular rate and rhythm


Lungs: Clear to auscultation bilaterally


Abdomen: 


Cervix: Initially in triage she was 1-1/2 cm and now is 3 cm/60%/-2 station


Fetal heart tones: Category 1


Contractions: Every 3-5 minutes


Extremities: Negative Homans





Assessment and Plan


(1) 37 weeks gestation of pregnancy


Current Visit: Yes   Status: Acute   Code(s): Z3A.37 - 37 WEEKS GESTATION OF 

PREGNANCY   SNOMED Code(s): 50981236


   





(2) Previous  delivery affecting pregnancy


Current Visit: Yes   Status: Acute   Code(s): O34.219 - MATERNAL CARE FOR UNSP 

TYPE SCAR FROM PREVIOUS  DEL   SNOMED Code(s): 346511163


   


Plan: 





Admission for repeat  section due to previous classical  section

and active labor.





I have discussed the risks, benefits, and alternative therapies for the above-

mentioned procedure and for both sedation/anesthesia as well as necessary blood 

products administration, if indicated, as they pertain to this patient.  The 

patient has indicated her understanding and acceptance of the risks and 

procedures discussed.

## 2022-05-22 NOTE — P.MSEPDOC
Presenting Problems





- Arrival Data


Date of Arrival on Unit: 22


Time of Arrival on Unit: 15:55


Mode of Transport: Ambulatory





- Complaint


OB-Reason for Admission/Chief Complaint: Possible Onset of Labor


Comment: pt presents to triage for contractions and tighening in abdomen





Prenatal Medical History





- Pregnancy Information


: 3


Para: 3


Term: 1


: 2





- Gestational Age


Gestational Age by CHANDRAKANT (wks/days): 36 Weeks and 6 Days





- Prenatal History


Pregnancy Complications: Prior , Prior 


Comment: classical scar with previous  section for twins, both delivered

at 26 weeks, baby B passed after delivery





Review of Systems





- Review of Systems


Constitutional: No problems


Breast: No problems


ENT: No problems


Cardiovascular: No problems


Respiratory: No problems


Gastrointestinal: No problems


Genitourinary: No problems


Musculoskeletal: No problems


Neurological: No problems


Skin: No problems





Vital Signs





- Temperature


Temperature: 97.8 F


Temperature Source: Temporal Artery Scan





- Pulse


  ** Right Brachial


Pulse Rate: 85


Pulse Assessment Method: Pulse Oximetry





- Respirations


Respiratory Rate: 16


Oxygen Delivery Method: Room Air


O2 Sat by Pulse Oximetry: 99





- Blood Pressure


  ** Right Arm


Blood Pressure: 107/57


Blood Pressure Mean: 73


Blood Pressure Source: Automatic Cuff





Medical Screen Scoring





- Cervical Exam


Dilation (cm): 3


Effacement (%): 50


Station: -2


Membranes: Intact





- Uterine Contractions


Frequency From (mins): 3


Frequency To (mins): 7


Duration From (seconds): 50


Duration To (seconds): 80


Intensity: Mild


Resting: Soft to palpation





- Fetal Assessment - Baby A


Baseline FHR: 135


Fetal Heart Rate - NICHD Category: Category I (Normal)


NST: Reactive





Physician Notification





- Physician Notified


Physician Notified Date: 22


Physician Notified Time: 17:35


Physician: Clara Ayala Order Received: Yes





- Notification Comment


Comment: pt made cervical change while in triage, admitted for labor and will 

have repeat  section delivery performed by Dr. Ayala, she is coming in, 

pt will be preped for OR





Maternal Fetal Triage Index





- Maternal Fetal Triage Index


Presenting for scheduled procedure w/no complaint: No





- Stat/Priority 1


Stat Priority 1: No





- Urgent/Priority 2


Urgent Priority 2: No





- Prompt/Priority 3


Prompt Priority 3: Yes


Criteria Met for Priority 3: pt presents to triage for contractions that are 5 

min apart and painful, pt GA 36 6/7





Disposition





- Disposition


OB Disposition: Admit


I agree with the RN Medical Screening Exam: Yes


Case reviewed; plan agreed upon as documented in EMR&OBIX.: Yes


Diagnosis: MATERNAL CARE FOR VERTICAL SCAR FROM PREVIOUS  DEL

## 2022-05-23 LAB
BASOPHILS # BLD AUTO: 0 K/UL (ref 0–0.2)
BASOPHILS NFR BLD AUTO: 0 %
EOSINOPHIL # BLD AUTO: 0 K/UL (ref 0–0.7)
EOSINOPHIL NFR BLD AUTO: 1 %
ERYTHROCYTE [DISTWIDTH] IN BLOOD BY AUTOMATED COUNT: 3.96 M/UL (ref 3.8–5.4)
ERYTHROCYTE [DISTWIDTH] IN BLOOD: 14 % (ref 11.5–15.5)
HCT VFR BLD AUTO: 34.5 % (ref 34–46)
HGB BLD-MCNC: 11.3 GM/DL (ref 11.4–16)
LYMPHOCYTES # SPEC AUTO: 1 K/UL (ref 1–4.8)
LYMPHOCYTES NFR SPEC AUTO: 13 %
MCH RBC QN AUTO: 28.6 PG (ref 25–35)
MCHC RBC AUTO-ENTMCNC: 32.8 G/DL (ref 31–37)
MCV RBC AUTO: 87 FL (ref 80–100)
MONOCYTES # BLD AUTO: 0.4 K/UL (ref 0–1)
MONOCYTES NFR BLD AUTO: 6 %
NEUTROPHILS # BLD AUTO: 6.2 K/UL (ref 1.3–7.7)
NEUTROPHILS NFR BLD AUTO: 80 %
PLATELET # BLD AUTO: 190 K/UL (ref 150–450)
WBC # BLD AUTO: 7.7 K/UL (ref 3.8–10.6)

## 2022-05-23 RX ADMIN — DOCUSATE SODIUM AND SENNOSIDES SCH EACH: 50; 8.6 TABLET ORAL at 21:26

## 2022-05-23 RX ADMIN — ACETAMINOPHEN SCH MG: 500 TABLET ORAL at 06:01

## 2022-05-23 RX ADMIN — POTASSIUM CHLORIDE SCH: 14.9 INJECTION, SOLUTION INTRAVENOUS at 02:29

## 2022-05-23 RX ADMIN — IBUPROFEN SCH MG: 600 TABLET ORAL at 21:25

## 2022-05-23 RX ADMIN — ACETAMINOPHEN SCH MG: 500 TABLET ORAL at 12:01

## 2022-05-23 RX ADMIN — IBUPROFEN SCH: 600 TABLET ORAL at 10:41

## 2022-05-23 RX ADMIN — IBUPROFEN SCH: 600 TABLET ORAL at 18:45

## 2022-05-23 RX ADMIN — KETOROLAC TROMETHAMINE PRN MG: 15 INJECTION, SOLUTION INTRAMUSCULAR; INTRAVENOUS at 15:28

## 2022-05-23 RX ADMIN — ACETAMINOPHEN SCH MG: 500 TABLET ORAL at 18:43

## 2022-05-23 RX ADMIN — ACETAMINOPHEN SCH: 500 TABLET ORAL at 10:42

## 2022-05-23 RX ADMIN — DOCUSATE SODIUM AND SENNOSIDES SCH EACH: 50; 8.6 TABLET ORAL at 08:23

## 2022-05-23 RX ADMIN — IBUPROFEN SCH: 600 TABLET ORAL at 05:48

## 2022-05-23 RX ADMIN — IBUPROFEN SCH: 600 TABLET ORAL at 15:54

## 2022-05-23 RX ADMIN — KETOROLAC TROMETHAMINE PRN MG: 15 INJECTION, SOLUTION INTRAMUSCULAR; INTRAVENOUS at 02:48

## 2022-05-23 NOTE — P.PN
Progress Note - Text


Progress Note Date: 22


Postoperative day 1 status post  section under spinal anesthesia, and i

ntrathecal morphine given for postoperative analgesia, patient doing well, there

is no anesthesia related complications, 


Patient had no headache, vital signs stable , 


Assessment and plan= postop day 1 status post , doing well there is no 

anesthesia related  complication.


note= patient was seen today at 7:15 in the morning.

## 2022-05-23 NOTE — P.PNOBGPC
Subjective





- Subjective


Principal diagnosis: This post repeat  section postoperative day #1


Interval history: 





Patient is doing well.  She is ambulating.  Lochia is minimal.  She is breast-

feeding.  She is passing some flatus but no bowel movement yet.  She is having 

some lower cramping.


Patient reports: Reports appetite normal, Reports voiding normally, Reports pain

well controlled, Reports ambulating normally


Coin: doing well, nursing well





Objective





- Vital Signs


Latest vital signs: 


                                   Vital Signs











  Temp Pulse Resp BP Pulse Ox


 


 22 12:00  97.8 F  82  17  103/64  100


 


 22 10:00    17  


 


 22 08:25  98.1 F  83  17  105/69  96


 


 22 08:00    17   96


 


 22 05:00    16  


 


 22 04:00  97.8 F  87  16  103/67  98


 


 22 02:25    16   97


 


 22 01:00    16  


 


 22 00:00  98.0 F  95  16  128/81  97


 


 22 23:57      97


 


 22 23:00    16  


 


 22 21:57    16  


 


 22 21:40  97.8 F  85  16  107/57  99


 


 22 21:25   71  16  115/76 


 


 22 20:55   79  16  113/74 


 


 22 20:25   85  16  115/76  95


 


 22 20:10   92  16  111/67  99


 


 22 19:57   85  16   99


 


 22 19:55   85  16  112/56  99


 


 22 19:41  97.8 F  91  16  107/57  98


 


 22 19:40  97.8 F  91  16  107/57  98


 


 22 19:25  97.8 F  82  16  104/60  98


 


 22 16:12  98.1 F  96  17  113/68 








                                Intake and Output











 22





 22:59 06:59 14:59


 


Output Total 705 150 400


 


Balance -705 -150 -400


 


Output:   


 


  Urine 100 150 400


 


  Estimated Blood Loss 500  


 


  Output, Quantitative 105  





  Blood Loss   


 


Other:   


 


  Voiding Method Indwelling Catheter Indwelling Catheter Toilet


 


  Weight 88.451 kg  














- Exam


Extremities: Present: normal.  Absent: tenderness


Abdomen: Present: normal appearance, soft (Positive bowel sounds 4).  Absent: 

distention, tenderness


Incision: Present: normal, dry, intact.  Absent: erythematous


Uterus: Present: normal, firm.  Absent: tenderness





- Labs


Labs: 


                  Abnormal Lab Results - Last 24 Hours (Table)











  22 Range/Units





  07:28 


 


Hgb  11.3 L  (11.4-16.0)  gm/dL














Assessment and Plan


Assessment: 





Status post repeat  section postoperative day #1


(1) 37 weeks gestation of pregnancy


Current Visit: Yes   Status: Acute   Code(s): Z3A.37 - 37 WEEKS GESTATION OF 

PREGNANCY   SNOMED Code(s): 39907599


   





(2) Previous  delivery affecting pregnancy


Current Visit: Yes   Status: Acute   Code(s): O34.219 - MATERNAL CARE FOR UNSP 

TYPE SCAR FROM PREVIOUS  DEL   SNOMED Code(s): 451140594


   


Plan: 





Continue with postoperative care today.  Anticipate possible discharge home 

tomorrow.  Will advance diet as tolerated after flatus.

## 2022-05-24 VITALS
SYSTOLIC BLOOD PRESSURE: 108 MMHG | RESPIRATION RATE: 14 BRPM | HEART RATE: 75 BPM | DIASTOLIC BLOOD PRESSURE: 72 MMHG | TEMPERATURE: 98.4 F

## 2022-05-24 RX ADMIN — IBUPROFEN SCH MG: 600 TABLET ORAL at 03:33

## 2022-05-24 RX ADMIN — ACETAMINOPHEN SCH MG: 500 TABLET ORAL at 06:26

## 2022-05-24 RX ADMIN — DOCUSATE SODIUM AND SENNOSIDES SCH EACH: 50; 8.6 TABLET ORAL at 10:21

## 2022-05-24 RX ADMIN — ACETAMINOPHEN SCH MG: 500 TABLET ORAL at 00:11

## 2022-05-24 RX ADMIN — IBUPROFEN SCH MG: 600 TABLET ORAL at 10:20

## 2022-05-24 NOTE — P.DS
Providers


Date of admission: 


22 17:37





Expected date of discharge: 22


Attending physician: 


Clara Ayala





Primary care physician: 


Stated None








- Discharge Diagnosis(es)


(1) 37 weeks gestation of pregnancy


Current Visit: Yes   Status: Acute   





(2) Previous  delivery affecting pregnancy


Current Visit: Yes   Status: Acute   


Hospital Course: 





Since is a 26-year-old female  3 para 2 at 36-6/7 weeks who presented in 

active labor.  She underwent a repeat low transverse  section and 

delivered a viable female infant with Apgar scores of 8 at 1 minute and 9 at 5 

minutes and infant weight of 5 lbs. 9 oz. on 2022.  Postoperatively she 

has done well.  Lochia has been decreasing.  Her pain is well-controlled.  She 

is passing flatus and feels like she has to have a bowel movement now.  She is 

breast-feeding.  Vital signs are stable.  Abdomen is soft with fundus firm and 

nontender.  Incision is clean dry and intact.  Extremities show negative Homans.

 Impression is status post repeat low transverse  section postoperative 

day #2.  Plan is to discharge home today.  Routine postoperative and postpartum 

instructions are given.  She will be given a prescription for a breast pump and 

ibuprofen.  She is advised to follow up in the office in about 2 weeks for a 

postoperative check and in 6 weeks for a postpartum check.  She is advised to 

call the office if she has any further questions or concerns prior to her 

appointment time.


Procedures: 





Repeat low transverse  section on 2022


Patient Condition at Discharge: Stable





Plan - Discharge Summary


New Discharge Prescriptions: 


New


   Ibuprofen [Motrin] 600 mg PO Q6H #60 tab


Discharge Medication List





Ibuprofen [Motrin] 600 mg PO Q6H #60 tab 22 [Rx]








Follow up Appointment(s)/Referral(s): 


Clara Ayala DO [Doctor of Osteopathic Medicine] - 22 11:30 am (c/s post 

op appt-2022@1:30pm)


Activity/Diet/Wound Care/Special Instructions: 


Postpartum Instructions





1.  Do not begin any exercise program for 3 weeks.


2.  Do not resume sexual relations for 3 weeks or longer if uncomfortable.


3.  You may take tub baths or showers at any time.


4.  You may use tampons if desired after 3 weeks.


5.  Keep the area of episiotomy (stitches) clean and dry.


6.  If you are not nursing, wear a good fitting, supportive bra during the day 

and limit fluid intake for at least 1 week to prevent breast engorgement.


7.  Call the office, [phone], within the next week to make appointment for your

6 week checkup if it has not already been made.


8.  Report any of the following occurrences to the doctor promptly:


     a.  Heavy, excessive bleeding


     b.  Chills, fever


     c.  Burning or frequency of urination


     d.  Pain or redness and breasts if nursing


     e.  Increasing pain or swelling in episiotomy (stitches).











In addition to the above instructions, the following additional should be 

followed:


1.  No heavy lifting or straining (exercising) until after 6 week checkup.


2.  Keep abdominal incision clean and dry: You may wear a dressing if more 

comfortable.


3.  Make office appointment for 10 days after going home or as instructed by her

doctor.


Discharge Disposition: HOME SELF-CARE